# Patient Record
Sex: FEMALE | Race: BLACK OR AFRICAN AMERICAN | NOT HISPANIC OR LATINO | Employment: FULL TIME | URBAN - METROPOLITAN AREA
[De-identification: names, ages, dates, MRNs, and addresses within clinical notes are randomized per-mention and may not be internally consistent; named-entity substitution may affect disease eponyms.]

---

## 2024-04-26 ENCOUNTER — HOSPITAL ENCOUNTER (EMERGENCY)
Facility: HOSPITAL | Age: 49
Discharge: HOME | End: 2024-04-26
Attending: STUDENT IN AN ORGANIZED HEALTH CARE EDUCATION/TRAINING PROGRAM
Payer: MEDICAID

## 2024-04-26 VITALS
RESPIRATION RATE: 17 BRPM | DIASTOLIC BLOOD PRESSURE: 79 MMHG | BODY MASS INDEX: 28.71 KG/M2 | HEART RATE: 70 BPM | OXYGEN SATURATION: 100 % | TEMPERATURE: 98 F | WEIGHT: 156 LBS | HEIGHT: 62 IN | SYSTOLIC BLOOD PRESSURE: 115 MMHG

## 2024-04-26 DIAGNOSIS — L03.011 PARONYCHIA OF RIGHT INDEX FINGER: Primary | ICD-10-CM

## 2024-04-26 PROCEDURE — 25000003 PHARM REV CODE 250: Performed by: NURSE PRACTITIONER

## 2024-04-26 PROCEDURE — 99283 EMERGENCY DEPT VISIT LOW MDM: CPT | Mod: 25

## 2024-04-26 PROCEDURE — 10060 I&D ABSCESS SIMPLE/SINGLE: CPT

## 2024-04-26 PROCEDURE — 63600175 PHARM REV CODE 636 W HCPCS: Mod: JZ,JG | Performed by: NURSE PRACTITIONER

## 2024-04-26 RX ORDER — LIDOCAINE HYDROCHLORIDE 10 MG/ML
1 INJECTION INFILTRATION; PERINEURAL ONCE
Status: COMPLETED | OUTPATIENT
Start: 2024-04-26 | End: 2024-04-26

## 2024-04-26 RX ORDER — BUPIVACAINE HYDROCHLORIDE 2.5 MG/ML
5 INJECTION, SOLUTION EPIDURAL; INFILTRATION; INTRACAUDAL
Status: COMPLETED | OUTPATIENT
Start: 2024-04-26 | End: 2024-04-26

## 2024-04-26 RX ORDER — TRAMADOL HYDROCHLORIDE 50 MG/1
50 TABLET ORAL EVERY 12 HOURS PRN
Qty: 4 TABLET | Refills: 0 | Status: SHIPPED | OUTPATIENT
Start: 2024-04-26 | End: 2024-04-28

## 2024-04-26 RX ORDER — HYDROCODONE BITARTRATE AND ACETAMINOPHEN 5; 325 MG/1; MG/1
1 TABLET ORAL
Status: COMPLETED | OUTPATIENT
Start: 2024-04-26 | End: 2024-04-26

## 2024-04-26 RX ADMIN — BUPIVACAINE HYDROCHLORIDE 12.5 MG: 2.5 INJECTION, SOLUTION EPIDURAL; INFILTRATION; INTRACAUDAL; PERINEURAL at 12:04

## 2024-04-26 RX ADMIN — HYDROCODONE BITARTRATE AND ACETAMINOPHEN 1 TABLET: 5; 325 TABLET ORAL at 12:04

## 2024-04-26 RX ADMIN — LIDOCAINE HYDROCHLORIDE 1 ML: 10 INJECTION, SOLUTION INFILTRATION; PERINEURAL at 01:04

## 2024-04-26 NOTE — ED PROVIDER NOTES
Encounter Date: 4/26/2024       History     Chief Complaint   Patient presents with    Hand Pain     Patient reports accidentally stuck toothpick under right pointer fingernail while digging in purse 2 weeks ago. Reports increased pain and swelling to finger/hand. Seen by UC and prescribed antibiotics.      See MDM    The history is provided by the patient. No  was used.     Review of patient's allergies indicates:  No Known Allergies  No past medical history on file.  No past surgical history on file.  No family history on file.     Review of Systems   Musculoskeletal:         Right 2nd digit pain/swelling/infection   All other systems reviewed and are negative.      Physical Exam     Initial Vitals [04/26/24 1059]   BP Pulse Resp Temp SpO2   127/80 63 18 98.1 °F (36.7 °C) 100 %      MAP       --         Physical Exam    Nursing note and vitals reviewed.  Constitutional: She appears well-developed and well-nourished.   Cardiovascular:  Intact distal pulses.           Pulmonary/Chest: No respiratory distress.   Musculoskeletal:      Right hand: Swelling (to distal 2nd digit) and tenderness (distal 2nd digit) present. Decreased range of motion (s/t pain).        Hands:      Neurological: She is alert and oriented to person, place, and time.   Skin: Skin is warm and dry.   Psychiatric: She has a normal mood and affect.         ED Course   I & D - Incision and Drainage    Date/Time: 4/26/2024 12:43 PM  Location procedure was performed: [facility] EMERGENCY DEPARTMENT    Performed by: Laura Holliday FNP  Authorized by: Laura Holliday FNP  Consent Done: Yes  Consent: Verbal consent obtained.  Risks and benefits: risks, benefits and alternatives were discussed  Consent given by: patient  Patient understanding: patient states understanding of the procedure being performed  Type: abscess (paronychia)  Body area: upper extremity  Location details: right index finger  Anesthesia: digital  block    Anesthesia:  Local Anesthetic: lidocaine 1% without epinephrine and bupivacaine 0.25% without epinephrine  Anesthetic total: 3 mL    Patient sedated: no  Scalpel size: 11  Incision type: single straight  Complexity: simple  Drainage: pus  Drainage amount: scant  Wound treatment: incision and drainage  Complications: No  Specimens: No  Implants: No  Patient tolerance: Patient tolerated the procedure well with no immediate complications        Labs Reviewed - No data to display       Imaging Results              X-Ray Finger 2 or More Views Right (Final result)  Result time 04/26/24 11:19:27      Final result by Sujit Andrade MD (04/26/24 11:19:27)                   Impression:      As above.      Electronically signed by: Sujit Andrade  Date:    04/26/2024  Time:    11:19               Narrative:    EXAMINATION:  XR FINGER 2 OR MORE VIEWS RIGHT    CLINICAL HISTORY:  Swelling;    TECHNIQUE:  Three views of the right 2nd digit    COMPARISON:  No prior imaging available for comparison    FINDINGS:  No displaced fracture.  No gross soft tissue abnormality.  The soft tissues are grossly unremarkable.                                       Medications   LIDOcaine HCL 10 mg/ml (1%) injection 1 mL (has no administration in time range)   BUPivacaine (PF) 0.25% (2.5 mg/ml) injection 12.5 mg (has no administration in time range)   HYDROcodone-acetaminophen 5-325 mg per tablet 1 tablet (1 tablet Oral Given 4/26/24 1219)     Medical Decision Making  49 y/o female presents with accidentally having tooth pick stuck her under the right 2nd digit about a week ago. She states the pain as been worsening. She also states that she took antibiotics and then she went to [facility] 2 days ago and had xray done but that was it and still hurting and nothing else was done. No fever.     Xray neg for acute findings. Finger upon exam reveals paronychia. Will plan to I&D     Amount and/or Complexity of Data  Reviewed  Radiology:  Decision-making details documented in ED Course.    Risk  Prescription drug management.      Additional MDM:   Differential Diagnosis:   Other: The following diagnoses were also considered and will be evaluated: abscess, cellulitis and paronychia.                                   Clinical Impression:  Final diagnoses:  [L03.011] Paronychia of right index finger (Primary)          ED Disposition Condition    Discharge Stable          ED Prescriptions       Medication Sig Dispense Start Date End Date Auth. Provider    traMADoL (ULTRAM) 50 mg tablet Take 1 tablet (50 mg total) by mouth every 12 (twelve) hours as needed for Pain. 4 tablet 4/26/2024 4/28/2024 Laura Holliday FNP          Follow-up Information       Follow up With Specialties Details Why Contact Info    Evette Valero MD Family Medicine Call in 1 week As needed [address] [facility] [address]  [phone]               Laura Holliday FNP  04/26/24 9016

## 2024-04-26 NOTE — FIRST PROVIDER EVALUATION
Medical screening examination initiated.  I have conducted a focused provider triage encounter, findings are as follows:    Brief history of present illness:  Patient states right index finger pain after sticking a toothpick under the nail.     There were no vitals filed for this visit.    Pertinent physical exam:  Awake, alert, ambulatory      Brief workup plan:  Imaging    Preliminary workup initiated; this workup will be continued and followed by the physician or advanced practice provider that is assigned to the patient when roomed.

## 2024-04-26 NOTE — DISCHARGE INSTRUCTIONS
Warm salt water soaks as often as you can over the next 12 hours. Apply the  mupirocin ointment and cover with bandaid. Tylenol and/or ibuprofen for pain if needed. May take tramadol for more severe pain if needed.